# Patient Record
Sex: FEMALE | Race: BLACK OR AFRICAN AMERICAN | NOT HISPANIC OR LATINO | Employment: FULL TIME | URBAN - METROPOLITAN AREA
[De-identification: names, ages, dates, MRNs, and addresses within clinical notes are randomized per-mention and may not be internally consistent; named-entity substitution may affect disease eponyms.]

---

## 2023-03-20 LAB
ABO GROUP (TYPE) IN BLOOD: NORMAL
ANTIBODY SCREEN: NORMAL
ERYTHROCYTE DISTRIBUTION WIDTH (RATIO) BY AUTOMATED COUNT: 12.7 % (ref 11.5–14.5)
ERYTHROCYTE MEAN CORPUSCULAR HEMOGLOBIN CONCENTRATION (G/DL) BY AUTOMATED: 32.9 G/DL (ref 32–36)
ERYTHROCYTE MEAN CORPUSCULAR VOLUME (FL) BY AUTOMATED COUNT: 92 FL (ref 80–100)
ERYTHROCYTES (10*6/UL) IN BLOOD BY AUTOMATED COUNT: 3.82 X10E12/L (ref 4–5.2)
HEMATOCRIT (%) IN BLOOD BY AUTOMATED COUNT: 35.3 % (ref 36–46)
HEMOGLOBIN (G/DL) IN BLOOD: 11.6 G/DL (ref 12–16)
HEPATITIS B VIRUS SURFACE AG PRESENCE IN SERUM: NONREACTIVE
HEPATITIS C VIRUS AB PRESENCE IN SERUM: NONREACTIVE
HIV 1/ 2 AG/AB SCREEN: NONREACTIVE
LEUKOCYTES (10*3/UL) IN BLOOD BY AUTOMATED COUNT: 12.4 X10E9/L (ref 4.4–11.3)
NRBC (PER 100 WBCS) BY AUTOMATED COUNT: 0 /100 WBC (ref 0–0)
PLATELETS (10*3/UL) IN BLOOD AUTOMATED COUNT: 339 X10E9/L (ref 150–450)
REFLEX ADDED, ANEMIA PANEL: ABNORMAL
RH FACTOR: NORMAL
RUBELLA VIRUS IGG AB: POSITIVE
SYPHILIS TOTAL AB: NONREACTIVE

## 2023-03-21 LAB
CHLAMYDIA TRACH., AMPLIFIED: NEGATIVE
CLUE CELLS: ABNORMAL
N. GONORRHEA, AMPLIFIED: NEGATIVE
NUGENT SCORE: 1
TRICHOMONAS VAGINALIS: NEGATIVE
URINE CULTURE: NORMAL
YEAST: PRESENT

## 2023-03-23 LAB
HEMOGLOBIN A2: 2.7 %
HEMOGLOBIN A: 96.4 %
HEMOGLOBIN F: 0.9 %
HEMOGLOBIN IDENTIFICATION INTERPRETATION: NORMAL
PATH REVIEW-HGB IDENTIFICATION: NORMAL

## 2023-06-08 LAB
CLUE CELLS: ABNORMAL
NUGENT SCORE: 2
YEAST: PRESENT

## 2023-06-09 LAB
CHLAMYDIA TRACH., AMPLIFIED: NEGATIVE
N. GONORRHEA, AMPLIFIED: NEGATIVE
TRICHOMONAS VAGINALIS: NEGATIVE

## 2023-07-15 LAB — FUNGAL SCREEN, YEAST: NORMAL

## 2023-07-16 ENCOUNTER — HOSPITAL ENCOUNTER (OUTPATIENT)
Dept: DATA CONVERSION | Facility: HOSPITAL | Age: 23
End: 2023-07-16
Attending: STUDENT IN AN ORGANIZED HEALTH CARE EDUCATION/TRAINING PROGRAM
Payer: COMMERCIAL

## 2023-07-16 DIAGNOSIS — Z34.80 ENCOUNTER FOR SUPERVISION OF OTHER NORMAL PREGNANCY, UNSPECIFIED TRIMESTER (HHS-HCC): ICD-10-CM

## 2023-07-16 DIAGNOSIS — O09.33 SUPERVISION OF PREGNANCY WITH INSUFFICIENT ANTENATAL CARE, THIRD TRIMESTER (HHS-HCC): ICD-10-CM

## 2023-07-16 DIAGNOSIS — N89.8 OTHER SPECIFIED NONINFLAMMATORY DISORDERS OF VAGINA: ICD-10-CM

## 2023-07-16 DIAGNOSIS — O26.893 OTHER SPECIFIED PREGNANCY RELATED CONDITIONS, THIRD TRIMESTER (HHS-HCC): ICD-10-CM

## 2023-07-16 DIAGNOSIS — Z3A.28 28 WEEKS GESTATION OF PREGNANCY (HHS-HCC): ICD-10-CM

## 2023-07-16 LAB
CLUE CELLS WET PREP: NORMAL
ERYTHROCYTE DISTRIBUTION WIDTH (RATIO) BY AUTOMATED COUNT: 12.3 % (ref 11.5–14.5)
ERYTHROCYTE MEAN CORPUSCULAR HEMOGLOBIN CONCENTRATION (G/DL) BY AUTOMATED: 32.5 G/DL (ref 32–36)
ERYTHROCYTE MEAN CORPUSCULAR VOLUME (FL) BY AUTOMATED COUNT: 94 FL (ref 80–100)
ERYTHROCYTES (10*6/UL) IN BLOOD BY AUTOMATED COUNT: 3.4 X10E12/L (ref 4–5.2)
FERRITIN, PREGNANCY: 20 UG/L
FOLATE, SERUM, PREGNANCY: >24 NG/ML
GLUCOSE, 1 HR SCREEN, PREG: 80 MG/DL
HEMATOCRIT (%) IN BLOOD BY AUTOMATED COUNT: 32 % (ref 36–46)
HEMOGLOBIN (G/DL) IN BLOOD: 10.4 G/DL (ref 12–16)
IRON (UG/DL) IN SER/PLAS IN PREGNANCY: 61 UG/DL
IRON BINDING CAPACITY (UG/DL) IN PREGNANCY: 503 UG/DL
IRON SATURATION (%) IN PREGNANCY: 12 %
LEUKOCYTES (10*3/UL) IN BLOOD BY AUTOMATED COUNT: 10.4 X10E9/L (ref 4.4–11.3)
NRBC (PER 100 WBCS) BY AUTOMATED COUNT: 0 /100 WBC (ref 0–0)
PLATELETS (10*3/UL) IN BLOOD AUTOMATED COUNT: 278 X10E9/L (ref 150–450)
REFLEX ADDED, ANEMIA PANEL: ABNORMAL
SYPHILIS TOTAL AB: NONREACTIVE
TRICH WET PREP: NORMAL
TRICHOMONAS REFLEX COMMENT: NORMAL
VITAMIN B12, PREGNANCY: 294 PG/ML
WBC WET PREP: NORMAL /HPF
YEAST PRESENCE WET PREP: NORMAL

## 2023-08-12 LAB
CLUE CELLS WET PREP: NORMAL
TRICH WET PREP: NORMAL
TRICHOMONAS REFLEX COMMENT: NORMAL
WBC WET PREP: NORMAL /HPF
YEAST PRESENCE WET PREP: NORMAL

## 2023-08-13 LAB — TRICHOMONAS VAGINALIS: NORMAL

## 2023-08-14 LAB
URINE CULTURE: ABNORMAL
URINE CULTURE: ABNORMAL

## 2023-08-21 LAB — UREAPLASMA/MYCOPLASMA CULTURE: NORMAL

## 2023-09-16 LAB
GROUP B STREP SCREEN: NORMAL
URINE CULTURE: ABNORMAL
URINE CULTURE: ABNORMAL

## 2023-09-29 ENCOUNTER — HOSPITAL ENCOUNTER (INPATIENT)
Dept: DATA CONVERSION | Facility: HOSPITAL | Age: 23
LOS: 2 days | Discharge: HOME | End: 2023-10-01
Attending: OBSTETRICS & GYNECOLOGY | Admitting: OBSTETRICS & GYNECOLOGY
Payer: COMMERCIAL

## 2023-09-29 ENCOUNTER — PATIENT OUTREACH (OUTPATIENT)
Dept: CARE COORDINATION | Facility: CLINIC | Age: 23
End: 2023-09-29
Payer: COMMERCIAL

## 2023-09-29 ENCOUNTER — MOTHER BABY LINK (OUTPATIENT)
Age: 23
End: 2023-09-29

## 2023-09-29 VITALS — HEIGHT: 65 IN | BODY MASS INDEX: 27.73 KG/M2 | WEIGHT: 166.45 LBS

## 2023-09-29 DIAGNOSIS — R87.610 ATYPICAL SQUAMOUS CELLS OF UNDETERMINED SIGNIFICANCE ON CYTOLOGIC SMEAR OF CERVIX (ASC-US): ICD-10-CM

## 2023-09-29 LAB
ABO GROUP (TYPE) IN BLOOD: NORMAL
ABO GROUP (TYPE) IN BLOOD: NORMAL
ANTIBODY SCREEN: NORMAL
ANTIBODY SCREEN: NORMAL
ERYTHROCYTE DISTRIBUTION WIDTH (RATIO) BY AUTOMATED COUNT: 13.1 % (ref 11.5–14.5)
ERYTHROCYTE DISTRIBUTION WIDTH (RATIO) BY AUTOMATED COUNT: NORMAL
ERYTHROCYTE MEAN CORPUSCULAR HEMOGLOBIN CONCENTRATION (G/DL) BY AUTOMATED: 32.4 G/DL (ref 32–36)
ERYTHROCYTE MEAN CORPUSCULAR HEMOGLOBIN CONCENTRATION (G/DL) BY AUTOMATED: NORMAL
ERYTHROCYTE MEAN CORPUSCULAR VOLUME (FL) BY AUTOMATED COUNT: 96 FL (ref 80–100)
ERYTHROCYTE MEAN CORPUSCULAR VOLUME (FL) BY AUTOMATED COUNT: NORMAL
ERYTHROCYTES (10*6/UL) IN BLOOD BY AUTOMATED COUNT: 3.7 X10E12/L (ref 4–5.2)
ERYTHROCYTES (10*6/UL) IN BLOOD BY AUTOMATED COUNT: NORMAL
HEMATOCRIT (%) IN BLOOD BY AUTOMATED COUNT: 35.5 % (ref 36–46)
HEMATOCRIT (%) IN BLOOD BY AUTOMATED COUNT: NORMAL
HEMOGLOBIN (G/DL) IN BLOOD: 11.5 G/DL (ref 12–16)
HEMOGLOBIN (G/DL) IN BLOOD: NORMAL
LEUKOCYTES (10*3/UL) IN BLOOD BY AUTOMATED COUNT: 11.2 X10E9/L (ref 4.4–11.3)
LEUKOCYTES (10*3/UL) IN BLOOD BY AUTOMATED COUNT: NORMAL
NRBC (PER 100 WBCS) BY AUTOMATED COUNT: 0 /100 WBC (ref 0–0)
NRBC (PER 100 WBCS) BY AUTOMATED COUNT: NORMAL
PLATELETS (10*3/UL) IN BLOOD AUTOMATED COUNT: 338 X10E9/L (ref 150–450)
PLATELETS (10*3/UL) IN BLOOD AUTOMATED COUNT: NORMAL
RH FACTOR: NORMAL
RH FACTOR: NORMAL
SYPHILIS TOTAL AB: NONREACTIVE

## 2023-09-29 PROCEDURE — 86900 BLOOD TYPING SEROLOGIC ABO: CPT

## 2023-09-29 PROCEDURE — 59409 OBSTETRICAL CARE: CPT

## 2023-09-29 PROCEDURE — 9990 CHARGE CONVERSION

## 2023-09-29 PROCEDURE — 86901 BLOOD TYPING SEROLOGIC RH(D): CPT

## 2023-09-29 PROCEDURE — 85027 COMPLETE CBC AUTOMATED: CPT

## 2023-09-29 PROCEDURE — 99213 OFFICE O/P EST LOW 20 MIN: CPT

## 2023-09-29 PROCEDURE — 86780 TREPONEMA PALLIDUM: CPT

## 2023-09-29 PROCEDURE — 88307 TISSUE EXAM BY PATHOLOGIST: CPT

## 2023-09-29 PROCEDURE — 76815 OB US LIMITED FETUS(S): CPT

## 2023-09-29 PROCEDURE — 86850 RBC ANTIBODY SCREEN: CPT

## 2023-09-29 RX ORDER — NIFEDIPINE 10 MG/1
10 CAPSULE ORAL ONCE AS NEEDED
Status: DISCONTINUED | OUTPATIENT
Start: 2023-09-30 | End: 2023-09-29

## 2023-09-29 RX ORDER — ACETAMINOPHEN 325 MG/1
650 TABLET ORAL EVERY 6 HOURS
Status: DISCONTINUED | OUTPATIENT
Start: 2023-09-30 | End: 2023-09-30

## 2023-09-29 RX ORDER — MEDROXYPROGESTERONE ACETATE 150 MG/ML
150 INJECTION, SUSPENSION INTRAMUSCULAR ONCE AS NEEDED
Status: DISCONTINUED | OUTPATIENT
Start: 2023-09-30 | End: 2023-09-29

## 2023-09-29 RX ORDER — ALUMINUM HYDROXIDE, MAGNESIUM HYDROXIDE, AND SIMETHICONE 1200; 120; 1200 MG/30ML; MG/30ML; MG/30ML
30 SUSPENSION ORAL EVERY 4 HOURS PRN
Status: DISCONTINUED | OUTPATIENT
Start: 2023-09-30 | End: 2023-09-29

## 2023-09-29 RX ORDER — ACETAMINOPHEN 325 MG/1
975 TABLET ORAL EVERY 6 HOURS
Status: DISCONTINUED | OUTPATIENT
Start: 2023-09-30 | End: 2023-10-01 | Stop reason: HOSPADM

## 2023-09-29 RX ORDER — ONDANSETRON HYDROCHLORIDE 2 MG/ML
4 INJECTION, SOLUTION INTRAVENOUS EVERY 6 HOURS PRN
Status: DISCONTINUED | OUTPATIENT
Start: 2023-09-30 | End: 2023-09-29

## 2023-09-29 RX ORDER — MISOPROSTOL 200 UG/1
800 TABLET ORAL ONCE AS NEEDED
Status: DISCONTINUED | OUTPATIENT
Start: 2023-09-30 | End: 2023-09-29

## 2023-09-29 RX ORDER — ADHESIVE BANDAGE
10 BANDAGE TOPICAL DAILY PRN
Status: DISCONTINUED | OUTPATIENT
Start: 2023-09-30 | End: 2023-09-29

## 2023-09-29 RX ORDER — IBUPROFEN 600 MG/1
600 TABLET ORAL EVERY 6 HOURS
Status: DISCONTINUED | OUTPATIENT
Start: 2023-09-30 | End: 2023-10-01 | Stop reason: HOSPADM

## 2023-09-29 RX ORDER — METOCLOPRAMIDE HYDROCHLORIDE 5 MG/ML
10 INJECTION INTRAMUSCULAR; INTRAVENOUS EVERY 6 HOURS PRN
Status: DISCONTINUED | OUTPATIENT
Start: 2023-09-30 | End: 2023-09-29

## 2023-09-29 RX ORDER — OXYTOCIN 10 [USP'U]/ML
10 INJECTION, SOLUTION INTRAMUSCULAR; INTRAVENOUS ONCE AS NEEDED
Status: DISCONTINUED | OUTPATIENT
Start: 2023-09-30 | End: 2023-09-29

## 2023-09-29 RX ORDER — METHYLERGONOVINE MALEATE 0.2 MG/ML
0.2 INJECTION INTRAVENOUS ONCE AS NEEDED
Status: DISCONTINUED | OUTPATIENT
Start: 2023-09-30 | End: 2023-09-29

## 2023-09-29 RX ORDER — CARBOPROST TROMETHAMINE 250 UG/ML
250 INJECTION, SOLUTION INTRAMUSCULAR ONCE AS NEEDED
Status: DISCONTINUED | OUTPATIENT
Start: 2023-09-30 | End: 2023-09-29

## 2023-09-29 RX ORDER — DIPHENHYDRAMINE HCL 25 MG
25 CAPSULE ORAL EVERY 6 HOURS PRN
Status: DISCONTINUED | OUTPATIENT
Start: 2023-09-30 | End: 2023-09-29

## 2023-09-29 RX ORDER — HYDRALAZINE HYDROCHLORIDE 20 MG/ML
5 INJECTION INTRAMUSCULAR; INTRAVENOUS ONCE AS NEEDED
Status: DISCONTINUED | OUTPATIENT
Start: 2023-09-30 | End: 2023-09-29

## 2023-09-29 RX ORDER — POLYETHYLENE GLYCOL 3350 17 G/17G
17 POWDER, FOR SOLUTION ORAL 2 TIMES DAILY PRN
Status: DISCONTINUED | OUTPATIENT
Start: 2023-09-30 | End: 2023-09-29

## 2023-09-29 RX ORDER — BISACODYL 10 MG/1
10 SUPPOSITORY RECTAL DAILY PRN
Status: DISCONTINUED | OUTPATIENT
Start: 2023-09-30 | End: 2023-09-29

## 2023-09-29 RX ORDER — TRANEXAMIC ACID 100 MG/ML
1000 INJECTION, SOLUTION INTRAVENOUS ONCE AS NEEDED
Status: DISCONTINUED | OUTPATIENT
Start: 2023-09-30 | End: 2023-09-29

## 2023-09-29 RX ORDER — LOPERAMIDE HYDROCHLORIDE 2 MG/1
4 CAPSULE ORAL EVERY 2 HOUR PRN
Status: DISCONTINUED | OUTPATIENT
Start: 2023-09-30 | End: 2023-09-29

## 2023-09-29 RX ORDER — IBUPROFEN 600 MG/1
600 TABLET ORAL EVERY 6 HOURS SCHEDULED
Status: DISCONTINUED | OUTPATIENT
Start: 2023-09-30 | End: 2023-09-30

## 2023-09-29 RX ORDER — LABETALOL HYDROCHLORIDE 5 MG/ML
20 INJECTION, SOLUTION INTRAVENOUS ONCE AS NEEDED
Status: DISCONTINUED | OUTPATIENT
Start: 2023-09-30 | End: 2023-09-29

## 2023-09-29 ASSESSMENT — EDINBURGH POSTNATAL DEPRESSION SCALE (EPDS)
I HAVE BEEN ABLE TO LAUGH AND SEE THE FUNNY SIDE OF THINGS: NOT QUITE SO MUCH NOW
I HAVE FELT SAD OR MISERABLE: YES, MOST OF THE TIME
I HAVE FELT SCARED OR PANICKY FOR NO GOOD REASON: YES, SOMETIMES
I HAVE BEEN ANXIOUS OR WORRIED FOR NO GOOD REASON: YES, VERY OFTEN
TOTAL SCORE: 20
I HAVE LOOKED FORWARD WITH ENJOYMENT TO THINGS: RATHER LESS THAN I USED TO
I HAVE BLAMED MYSELF UNNECESSARILY WHEN THINGS WENT WRONG: YES, SOME OF THE TIME
I HAVE BEEN SO UNHAPPY THAT I HAVE HAD DIFFICULTY SLEEPING: YES, SOMETIMES
THINGS HAVE BEEN GETTING ON TOP OF ME: YES, SOMETIMES I HAVEN'T BEEN COPING AS WELL AS USUAL
THE THOUGHT OF HARMING MYSELF HAS OCCURRED TO ME: SOMETIMES
I HAVE BEEN SO UNHAPPY THAT I HAVE BEEN CRYING: YES, QUITE OFTEN

## 2023-09-30 PROCEDURE — 86901 BLOOD TYPING SEROLOGIC RH(D): CPT

## 2023-09-30 PROCEDURE — 86900 BLOOD TYPING SEROLOGIC ABO: CPT

## 2023-09-30 PROCEDURE — 86780 TREPONEMA PALLIDUM: CPT

## 2023-09-30 PROCEDURE — 9990 CHARGE CONVERSION

## 2023-09-30 PROCEDURE — 85027 COMPLETE CBC AUTOMATED: CPT

## 2023-09-30 PROCEDURE — 86850 RBC ANTIBODY SCREEN: CPT

## 2023-09-30 PROCEDURE — 1120000001 HC OB PRIVATE ROOM DAILY

## 2023-09-30 ASSESSMENT — PAIN SCALES - GENERAL
PAINLEVEL_OUTOF10: 7
PAINLEVEL_OUTOF10: 0 - NO PAIN
PAINLEVEL_OUTOF10: 6
PAINLEVEL_OUTOF10: 0 - NO PAIN

## 2023-09-30 ASSESSMENT — PAIN DESCRIPTION - DESCRIPTORS: DESCRIPTORS: SORE

## 2023-09-30 NOTE — H&P
HPI/OB History:   Prenatal Care Provider: [place]     HPI Descriptive Info:  ·  HPI    23y  at 39.1wga by 16w US presented in active labor, then delivered shortly after arrival.    Pregnancy c/b:  - Anemia  - Short interval pregnancy    Ob:  -  () @ 39.4wga   Gyn: denies hx of STIs or abnormal Paps   PMH: denies  PSH: denies  Meds: PNV  Allergies: Latex  Family Hx: reviewed-non contributory   Social: denies t/e/d        Prenatal Labs:   Prenatal Labs:    Prenatal Labs:   Blood Typed Date: 20-Mar-2023   Blood Type: O positive   Antibody Screen Results: negative   Chlamydia Date: 2023   Chlamydia Results: negative   Gonorrhea Date: 2023   Gonorrhea Results: negative   Group B Strep Date: 12-Sep-2023   Strep Results: negative   GCT (dd-mmm-yy): 2023   GCT result: 80   HBsAG Date: 20-Mar-2023   HBsAG Results: negative   HIV Date: 20-Mar-2023   HIV Results: negative   Rubella Date: 20-Mar-2023   Rubella Results: immune   Rubella Comments: Result Value POSITIVE   Syphilis (mmm-dd-yyyy): 2023   Syphilis Results: negative     Antepartum/Postpartum:   Antepartum/PP:  Delivery Date 29-Sep-2023   Days since delivery 0              Allergies:  ·  NKDA :   ·  Latex : Anaphylaxis (Mild)    Medications Prior to Admission:   Home meds have been reviewed, but review is not yet complete  Prenatal 1 Plus 1 oral tablet: 1 tab(s) orally once a day.    Objective:     Objective Information:      T   P  R  BP   MAP  SpO2   Value     58     118/77   94  100%  Date/Time    9:32    9:29   9:29  9:32  Range     (57 - 60 )    (118 - 118 )/ (77 - 77 )  (94 - 94 )  (99% - 100% )    Physical Exam by System:    Constitutional: alert, oriented   Obstetric: fundus firm <u   Eyes: EOMI   ENMT: MMM   Head/Neck: NC/AT   Respiratory/Thorax: normal respiratory effort   Gastrointestinal: gravid, non-tender   Musculoskeletal: normal ROM   Extremities: no LE redness or swelling   Psychological:  normal mood and affect   Skin: warm and dry     Recent Lab Results:    Results:    CBC: 2023 09:24              \     Hgb     /                              \     Canceled       /  WBC  ----------------  Plt               Canceled       ----------------    Canceled              /     Hct     \                              /     Canceled       \            RBC: Canceled     MCV: Canceled         Assessment and Plan:   Assessment:    23y  at 39.1wga by 16w US presented in active labor, then delivered shortly after arrival.    PPD#0  - pain control with toradol, tylenol   - fundus firm and nontender  - vitals wnl, ambulating, tolerating regular diet, voiding spontaneously  - minimal lochia, continue to monitor for si/sy of anemia   - continue routine postpartum care     Feeding - breast/bottle  - Continue to encourage breast feeding  - Lactation consult as needed     Postpartum Birth control  - Plans for interval IUD      DVT prophylaxis  - SCDs  - Ambulation  - Low risk for VTE, no indication for pharmacologic PPx     Dispo: anticipate discharge on PPD#2 if continues to meet milestones. Plan for follow up in 4-6 weeks for PP visit.    d/w Dr. Gt Cristina, PGY-4     Attestation:   Note Completion:  I am a:  Resident/Fellow   Attending Attestation I saw and evaluated the patient.  I personally obtained the key and critical portions of the history and physical exam or was physically present for key and  critical portions performed by the resident/fellow. I reviewed the resident/fellow?s documentation and discussed the patient with the resident/fellow.  I agree with the resident/fellow?s medical decision making as documented in the note.     I personally evaluated the patient on 29-Sep-2023   Comments/ Additional Findings    Patient presented with subsequent precipitous vaginal delivery. Uncomplicated pregnancy and delivery.     Sylvia Del Real MD           Electronic  Signatures:  Sylvia Del Real)  (Signed 29-Sep-2023 09:58)   Authored: Note Completion   Co-Signer: HPI/OB History, Prenatal Labs, Antepartum/PP, Allergies, Medications Prior to Admission, Objective, Assessment and Plan, Note  Completion  Didi Cristina (Resident))  (Signed 29-Sep-2023 09:57)   Authored: HPI/OB History, Prenatal Labs, Antepartum/PP,  Allergies, Medications Prior to Admission, Objective, Assessment and Plan, Note Completion      Last Updated: 29-Sep-2023 09:58 by Sylvia Del Real)

## 2023-09-30 NOTE — PROGRESS NOTES
Postpartum Progress Note    Assessment/Plan   Eloise Garcia is a 23 y.o., , who delivered at 39w2d gestation and is now postpartum day 1.    Postpartum  PP course uneventful.  Meeting all postpartum milestones- ambulating independently, passing flatus, tolerating PO intake, lochia light, voiding spontaneously, and pain well controlled with PO meds.   Rh: pos  DVT low risk for SCDs    Anemia- Hg 11.5 on admit    Dispo  Anticipate DC PPD2 if continues to meet milestones  - 4-6wk f/u    Active Problems:  There are no active Hospital Problems.    Pregnancy Problems (from 24 to present)       No problems associated with this episode.          Hospital course: no complications       Subjective   Her pain is well controlled with current medications  She is passing flatus  She is ambulating well  She is tolerating a Adult diet Regular  She reports no breast or nursing problems  She denies emotional concerns today   Her plan for contraception is IUD w/progesterone, interval         Objective   Allergies:   Latex         Last Vitals:  Temp Pulse Resp BP MAP Pulse Ox   37.1 °C (98.8 °F) 90 16 123/70 87 mmHg 94 %     Vitals Min/Max Last 24 Hours:  Temp  Min: 36.2 °C (97.2 °F)  Max: 37.1 °C (98.8 °F)  Pulse  Min: 76  Max: 90  Resp  Min: 16  Max: 18  BP  Min: 108/69  Max: 123/70  MAP  Min: 80 mmHg  Max: 87 mmHg    Intake/Output:     Intake/Output Summary (Last 24 hours) at 2023 1639  Last data filed at 2023 0817  Gross per 24 hour   Intake --   Output 850 ml   Net -850 ml       Physical Exam:  General: Examination reveals a well developed, well nourished, female, in no acute distress. She is alert and cooperative.  Fundus: firm and below umbilicus.    Lab Data:  Lab Results   Component Value Date    WBC 11.2 2023    HGB 11.5 (L) 2023    HCT 35.5 (L) 2023     2023

## 2023-10-01 VITALS
WEIGHT: 167.99 LBS | SYSTOLIC BLOOD PRESSURE: 105 MMHG | OXYGEN SATURATION: 96 % | TEMPERATURE: 98.4 F | DIASTOLIC BLOOD PRESSURE: 67 MMHG | HEART RATE: 80 BPM | RESPIRATION RATE: 18 BRPM | HEIGHT: 66 IN | BODY MASS INDEX: 27 KG/M2

## 2023-10-01 PROCEDURE — 2500000001 HC RX 250 WO HCPCS SELF ADMINISTERED DRUGS (ALT 637 FOR MEDICARE OP): Performed by: OBSTETRICS & GYNECOLOGY

## 2023-10-01 RX ORDER — IBUPROFEN 600 MG/1
600 TABLET ORAL EVERY 6 HOURS
Qty: 60 TABLET | Refills: 0 | Status: SHIPPED | OUTPATIENT
Start: 2023-10-01 | End: 2023-11-09 | Stop reason: ALTCHOICE

## 2023-10-01 RX ORDER — POLYETHYLENE GLYCOL 3350 17 G/17G
17 POWDER, FOR SOLUTION ORAL DAILY
Qty: 14 PACKET | Refills: 0 | Status: SHIPPED | OUTPATIENT
Start: 2023-10-01 | End: 2023-11-09 | Stop reason: ALTCHOICE

## 2023-10-01 RX ORDER — ACETAMINOPHEN 325 MG/1
975 TABLET ORAL EVERY 6 HOURS
Qty: 120 TABLET | Refills: 0 | Status: SHIPPED | OUTPATIENT
Start: 2023-10-01 | End: 2023-11-09 | Stop reason: ALTCHOICE

## 2023-10-01 RX ADMIN — ACETAMINOPHEN 975 MG: 325 TABLET, FILM COATED ORAL at 11:43

## 2023-10-01 RX ADMIN — IBUPROFEN 600 MG: 600 TABLET, FILM COATED ORAL at 11:43

## 2023-10-01 ASSESSMENT — PAIN DESCRIPTION - DESCRIPTORS: DESCRIPTORS: SORE

## 2023-10-01 ASSESSMENT — PAIN SCALES - GENERAL
PAINLEVEL_OUTOF10: 7
PAINLEVEL_OUTOF10: 7

## 2023-10-01 NOTE — CARE PLAN
The patient's goals for the shift include      The clinical goals for the shift include      Over the shift, the patient did not make progress toward the following goals. Barriers to progression include none. Recommendations to address these barriers include none.

## 2023-10-01 NOTE — DISCHARGE SUMMARY
Discharge Summary    Eloise Garcia is a 23 y.o. year old female   Admission Date: 9/29/2023  Discharge Date: 10/01/23       Discharge Diagnosis  Spontaneous vaginal delivery    Hospital Course  Delivery Date: 9/29/2023  9:05 AM   Delivery type:   Spontaneous vaginal delivery  GA at delivery: Unknown  Outcome:  liveborn, uncomplicated  Intrapartum complications:  none  Feeding method: Breastfeeding Status: No     Procedures: none  Contraception at discharge: Interested in IUD. Defers contraception to primary OB/PP visit. We discussed pregnancy spacing of at least one year, abstaining from intercourse for 6wks, and the ability to become pregnant in the absence of regular menses. Pt verbalized understanding.    Pertinent Physical Exam At Time of Discharge    General: Examination reveals a well developed, well nourished, female, in no acute distress. She is alert and cooperative.  Lungs: non-labored, symmetrical breathing.  Cardiac: warm, well-perfused.  Abdomen: soft, non-tender.  Fundus: firm, below umbilicus, and nontender.  Extremities: no redness or tenderness in the calves or thighs.  Neurological: alert, oriented, normal speech, no focal findings or movement disorder noted.    Discharge Meds     Your medication list        START taking these medications        Instructions Last Dose Given Next Dose Due   ibuprofen 600 mg tablet      Take 1 tablet (600 mg) by mouth every 6 hours.       polyethylene glycol 17 gram packet  Commonly known as: Glycolax, Miralax      Take 17 g by mouth once daily.              CHANGE how you take these medications        Instructions Last Dose Given Next Dose Due   acetaminophen 325 mg tablet  Commonly known as: Tylenol  What changed:   when to take this  reasons to take this      Take 3 tablets (975 mg) by mouth every 6 hours.              CONTINUE taking these medications        Instructions Last Dose Given Next Dose Due   Prenatal Vitamin 27 mg iron-800 mcg folic acid  tablet  Generic drug: prenatal vitamin (iron-folic)      TAKE 1 TABLET BY MOUTH DAILY AS DIRECTED              STOP taking these medications      azithromycin 250 mg tablet  Commonly known as: Zithromax        FeroSuL 325 (65 Fe) MG tablet  Generic drug: ferrous sulfate        Hydrocortisone Plus 1 % cream  Generic drug: hydrocortisone        magnesium oxide 400 mg (241.3 mg magnesium) tablet  Commonly known as: Mag-Ox        terconazole 0.8 % vaginal cream  Commonly known as: Terazol 3                  Where to Get Your Medications        These medications were sent to [facility]  [address] [place] [address]      Hours: 8:30 AM to 5 PM Mon-Fri Phone: [phone]   acetaminophen 325 mg tablet  ibuprofen 600 mg tablet  polyethylene glycol 17 gram packet          Complications Requiring Follow-Up  None    Test Results Pending At Discharge  Pending Labs       Order Current Status    SURGICAL PATHOLOGY RESULTS In process            Outpatient Follow-Up  Follow up with your primary OB in 4-6 weeks for postpartum visit    I spent 20 minutes in the professional and overall care of this patient.      Kellen Branham, APRN-CNP

## 2023-10-02 NOTE — SIGNIFICANT EVENT
Follow-up Phone Call Note:   Interview:  Care Type: Women's Health   Phone Number Call [phone]   Call Outcome:no answer   Patient Reports Feeling (symptoms) Are:    Which Meds Were New Meds   Which Meds to Continue:    Which Meds to Stop:   Who participated in medication reconciliation with the hospital staff?:    In your professional opinion do you think there was a medication discrepancy or potential for medication discrepancy in this situation?: no   Medication Issues:   Discharge Instructions Clear:    Patient Has a Primary Care Provider:    Post-hospitalization Follow-up Occurred According To Schedule:    Reason: a   Delivered Baby(ies):    Chest Pain:    Shortness of breath or difficulty breathing:    Seizures:    Any thoughts of hurting yourself or your baby:   Bleeding that is soaking through one pad/hour or blood clots the size of an egg or bigger:    Incision that is not healing:    Red or swollen leg that is painful or warm to the touch:    Temperature of 100.4F or higher:   Headache that does not get better, even after taking medicine, or a bad headache with vision changes:    Where or in what is your baby sleeping?:   ABC's of sleep covered:    How Are You Feeding Your Baby(ies):   Baby Getting Anything Other Than Breastmilk Or Formula For Food:   Patient Has Primary Care Provider For Baby(ies):   Baby Has Been Seen By a Health Care Provider Since Discharge:   Which Health Care Provider Saw the Baby:      Date the Baby Was Seen By a Health Care Provider   Patient Has Plan Specific Name And Number Of Who To Call For Concerns:    Stroke Patient:    Date/Time Of Call: 10/2/23 at 1741   Call Back Done By: care coordinator   Callback Complete: yes

## 2023-10-03 ENCOUNTER — PHARMACY VISIT (OUTPATIENT)
Dept: PHARMACY | Facility: CLINIC | Age: 23
End: 2023-10-03
Payer: MEDICAID

## 2023-10-03 PROCEDURE — RXMED WILLOW AMBULATORY MEDICATION CHARGE

## 2023-10-05 LAB
COMPLETE PATHOLOGY REPORT: NORMAL
CONVERTED CLINICAL DIAGNOSIS-HISTORY: NORMAL
CONVERTED FINAL DIAGNOSIS: NORMAL
CONVERTED FINAL REPORT PDF LINK TO COPY AND PASTE: NORMAL
CONVERTED GROSS DESCRIPTION: NORMAL

## 2023-10-11 PROCEDURE — 9990 CHARGE CONVERSION

## 2023-10-11 PROCEDURE — 88307 TISSUE EXAM BY PATHOLOGIST: CPT

## 2023-11-09 ENCOUNTER — OFFICE VISIT (OUTPATIENT)
Dept: OBSTETRICS AND GYNECOLOGY | Facility: CLINIC | Age: 23
End: 2023-11-09
Payer: COMMERCIAL

## 2023-11-09 VITALS
DIASTOLIC BLOOD PRESSURE: 84 MMHG | WEIGHT: 156 LBS | BODY MASS INDEX: 25.25 KG/M2 | SYSTOLIC BLOOD PRESSURE: 123 MMHG | HEART RATE: 65 BPM

## 2023-11-09 DIAGNOSIS — Z11.3 ROUTINE SCREENING FOR STI (SEXUALLY TRANSMITTED INFECTION): Primary | ICD-10-CM

## 2023-11-09 DIAGNOSIS — Z30.430 ENCOUNTER FOR IUD INSERTION: ICD-10-CM

## 2023-11-09 PROCEDURE — 58300 INSERT INTRAUTERINE DEVICE: CPT | Performed by: ADVANCED PRACTICE MIDWIFE

## 2023-11-09 PROCEDURE — 99214 OFFICE O/P EST MOD 30 MIN: CPT | Performed by: ADVANCED PRACTICE MIDWIFE

## 2023-11-09 PROCEDURE — 87800 DETECT AGNT MULT DNA DIREC: CPT | Performed by: ADVANCED PRACTICE MIDWIFE

## 2023-11-09 PROCEDURE — 87661 TRICHOMONAS VAGINALIS AMPLIF: CPT | Mod: 59 | Performed by: ADVANCED PRACTICE MIDWIFE

## 2023-11-09 PROCEDURE — 2500000004 HC RX 250 GENERAL PHARMACY W/ HCPCS (ALT 636 FOR OP/ED): Mod: SE | Performed by: ADVANCED PRACTICE MIDWIFE

## 2023-11-09 RX ADMIN — LEVONORGESTREL 52 MG: 52 INTRAUTERINE DEVICE INTRAUTERINE at 13:36

## 2023-11-09 ASSESSMENT — EDINBURGH POSTNATAL DEPRESSION SCALE (EPDS)
THINGS HAVE BEEN GETTING ON TOP OF ME: NO, MOST OF THE TIME I HAVE COPED QUITE WELL
I HAVE BLAMED MYSELF UNNECESSARILY WHEN THINGS WENT WRONG: YES, SOME OF THE TIME
I HAVE FELT SCARED OR PANICKY FOR NO GOOD REASON: NO, NOT MUCH
TOTAL SCORE: 12
I HAVE BEEN ANXIOUS OR WORRIED FOR NO GOOD REASON: YES, SOMETIMES
THE THOUGHT OF HARMING MYSELF HAS OCCURRED TO ME: NEVER
I HAVE FELT SAD OR MISERABLE: YES, QUITE OFTEN
I HAVE BEEN SO UNHAPPY THAT I HAVE BEEN CRYING: ONLY OCCASIONALLY
I HAVE BEEN ABLE TO LAUGH AND SEE THE FUNNY SIDE OF THINGS: NOT QUITE SO MUCH NOW
I HAVE BEEN SO UNHAPPY THAT I HAVE HAD DIFFICULTY SLEEPING: NOT VERY OFTEN
I HAVE LOOKED FORWARD WITH ENJOYMENT TO THINGS: RATHER LESS THAN I USED TO

## 2023-11-09 NOTE — PROGRESS NOTES
Subjective   23 y.o.  presenting for postpartum follow-up.  Delivery Date: 2023  Gestational Age: 39.1  Type of Delivery: Vaginal, Spontaneous      Pregnancy Problems (from 23 to 23)    Short interval pregnancy   Anemia      Concerns: none  Lacerations: none  Lochia: stopped  Sexual Intimacy: Yes - reports pain after sex, burning and discomfort with urination. No lesions noted.   Has had unprotected sex multiple times in the last 3 weeks   Contraceptive Method: None  - desires IUD   Feeding Method: She is bottle feeding.  Lactation Consult Needed?: No  Birth Trauma: No  Bonding with Baby: well with baby girlValerie  Mood:   Postpartum Depression: High Risk (2023)    [place]  Depression Scale     Last EPDS Total Score: 12     Last EPDS Self Harm Result: Never     Last pap: 3/2023 NIL  Physical Exam  Constitutional:       Appearance: Normal appearance. She is normal weight.   Genitourinary:      Vulva normal.      Uterus is not enlarged or tender.      Uterus is anteverted.   HENT:      Head: Normocephalic.   Pulmonary:      Effort: Pulmonary effort is normal.   Abdominal:      General: Abdomen is flat.      Palpations: Abdomen is soft.   Musculoskeletal:      Cervical back: Normal range of motion.   Neurological:      General: No focal deficit present.      Mental Status: She is alert and oriented to person, place, and time.   Skin:     General: Skin is warm and dry.   Psychiatric:         Mood and Affect: Mood normal.         Behavior: Behavior normal.   Vitals reviewed.       IUD Insertion Procedure Note    Indications: contraception    Procedure Details   Urine pregnancy test was done today and result was negative .  The risks (including infection, bleeding, pain, and uterine perforation) and benefits of the procedure were explained to the patient and Verbal informed consent was obtained.      Procedure: Mirena (IUD) placement  Cervix cleansed with Betadine. Uterus sounded  to 9 cm.   Local anesthesia:   none  Tenaculum used:  Yes, single tooth, anterior  IUD inserted without difficulty.   String visible and trimmed to 3 cm.  Patient tolerated procedure well.    Condition:  Stable    Complications:  None    Plan:  The patient was advised to call for any fever or for prolonged or severe pain or bleeding. She was advised to use OTC ibuprofen as needed for mild to moderate pain.   IUD Insertion    Date/Time: 11/9/2023 2:26 PM    Performed by: JONAS Painting APRN-CNP  Authorized by: JONAS Painting APRN-CNP    Consent:     Consent obtained:  Verbal    Consent given by:  Patient    Procedure risks and benefits discussed: yes      Patient questions answered: yes      Patient agrees, verbalizes understanding, and wants to proceed: yes      Educational handouts given: yes      Instructions and paperwork completed: yes    Procedure:     Pelvic exam performed: yes      Negative urine pregnancy test: yes      Cervix cleaned and prepped: yes      Speculum placed in vagina: yes      Tenaculum applied to cervix: yes      Uterus sounded: yes      Uterus sound depth (cm):  9    IUD inserted with no complications: yes      IUD type:  Mirena  Post-procedure:     Patient tolerated procedure well: yes      Patient will follow up after next period: yes         OJNAS Painting, APRN-CNP    Plan    Advised to call office abnormal bleeding, mood changes, or other concerning symptoms.   Cleared to resume normal activity as desired  Continue counseling @ [facility]. Discussed PP depression/anxiety and warning signs. Patient declines additional mental health resources at this time.   Reviewed negative UPT today but I am unable to confirm she is not pregnant from recent unprotected intercourse iwht the last two weeks. Reviewed risks/benefits of IUD placement today. Discussed taking a pregnancy test in 2 weeks to confirm that she is not pregnant. Discussed removal with +UPT and chance of  SAB. Reviewed need for back up method x 7 days following insertion.     Diagnoses and all orders for this visit:  Routine screening for STI (sexually transmitted infection)  -     C. Trachomatis / N. Gonorrhoeae, Amplified Detection  -     TRICH VAGINALIS, AMPLIFIED  Encounter for postpartum visit  Encounter for IUD insertion  -     levonorgestrel (Mirena) 21 mcg/24 hours (8 yrs) 52 mg IUD    Carmel Horner, APRESPINOZA-DELICIAM, APRN-CNP

## 2023-11-10 LAB
C TRACH RRNA SPEC QL NAA+PROBE: NEGATIVE
N GONORRHOEA DNA SPEC QL PROBE+SIG AMP: NEGATIVE
T VAGINALIS RRNA SPEC QL NAA+PROBE: NEGATIVE

## 2023-11-21 ENCOUNTER — PHARMACY VISIT (OUTPATIENT)
Dept: PHARMACY | Facility: CLINIC | Age: 23
End: 2023-11-21
Payer: MEDICAID

## 2023-11-21 PROCEDURE — RXMED WILLOW AMBULATORY MEDICATION CHARGE

## 2023-11-21 RX ORDER — CHLORHEXIDINE GLUCONATE 40 MG/ML
SOLUTION TOPICAL
Qty: 236 ML | Refills: 3 | OUTPATIENT
Start: 2023-11-21 | End: 2023-12-29 | Stop reason: WASHOUT

## 2023-11-21 RX ORDER — ALUMINUM CHLORIDE 20 %
SOLUTION, NON-ORAL TOPICAL
Qty: 60 ML | Refills: 3 | OUTPATIENT
Start: 2023-11-21 | End: 2023-12-29 | Stop reason: WASHOUT

## 2023-12-11 ENCOUNTER — APPOINTMENT (OUTPATIENT)
Dept: OBSTETRICS AND GYNECOLOGY | Facility: HOSPITAL | Age: 23
End: 2023-12-11
Payer: COMMERCIAL

## 2023-12-29 ENCOUNTER — OFFICE VISIT (OUTPATIENT)
Dept: OBSTETRICS AND GYNECOLOGY | Facility: CLINIC | Age: 23
End: 2023-12-29
Payer: COMMERCIAL

## 2023-12-29 VITALS
WEIGHT: 153 LBS | HEART RATE: 85 BPM | SYSTOLIC BLOOD PRESSURE: 126 MMHG | DIASTOLIC BLOOD PRESSURE: 82 MMHG | BODY MASS INDEX: 24.77 KG/M2

## 2023-12-29 DIAGNOSIS — Z32.02 PREGNANCY TEST NEGATIVE: Primary | ICD-10-CM

## 2023-12-29 LAB — PREGNANCY TEST URINE, POC: NEGATIVE

## 2023-12-29 PROCEDURE — 1036F TOBACCO NON-USER: CPT

## 2023-12-29 PROCEDURE — 99213 OFFICE O/P EST LOW 20 MIN: CPT | Mod: GC

## 2023-12-29 PROCEDURE — 99213 OFFICE O/P EST LOW 20 MIN: CPT

## 2023-12-29 ASSESSMENT — PAIN SCALES - GENERAL: PAINLEVEL: 6

## 2023-12-29 NOTE — PROGRESS NOTES
I saw and evaluated the patient. I personally obtained the key and critical portions of the history and physical exam or was physically present for key and critical portions performed by the resident/fellow. I reviewed the resident/fellow's documentation and discussed the patient with the resident/fellow. I agree with the resident/fellow's medical decision making as documented in the note.    Karine Magana MD

## 2023-12-29 NOTE — PROGRESS NOTES
Subjective   Patient ID: Eloise Garcia is a 23 y.o. female who presents for IUD Check (Pt in office for IUD removal, new birth control/LMP; bleeding since IUD placement/Last pap 3/30/2023 wnl/Chaperone declined).    HPI  Patient reports spotting since the placement of her IUD on 11/9. Reports brown discharge on her underwear every day. Has had a light period since her delivery. Patient not interested in adding an OCP. Has been sexually active with no issues.    Review of Systems   All other systems reviewed and are negative.      Objective   Physical Exam  Vitals reviewed. Exam conducted with a chaperone present.   Constitutional:       General: She is not in acute distress.     Appearance: She is not toxic-appearing.   HENT:      Head: Normocephalic and atraumatic.      Nose: Nose normal.      Mouth/Throat:      Mouth: Mucous membranes are moist.   Eyes:      Extraocular Movements: Extraocular movements intact.      Conjunctiva/sclera: Conjunctivae normal.      Pupils: Pupils are equal, round, and reactive to light.   Cardiovascular:      Rate and Rhythm: Normal rate and regular rhythm.   Pulmonary:      Effort: Pulmonary effort is normal.      Breath sounds: Normal breath sounds.   Abdominal:      Palpations: Abdomen is soft.   Genitourinary:     General: Normal vulva.      Comments: Cervix visualized. Small amount of blood in vaginal vault. IUD strings visualized.  Skin:     General: Skin is warm and dry.   Neurological:      General: No focal deficit present.      Mental Status: She is alert.   Psychiatric:         Mood and Affect: Mood normal.         Assessment/Plan   22yo here for IUD check and vaginal spotting.    - counseled patient on bleeding expectations with hormonal IUD  - explained that spotting within the first 3 months is normal and will decrease with time  - patient wanting to keep IUD, not interested in additional contraception at this time    RTC as needed.         Seen and discussed w/   Izzy Marie MD PGY-1  Obstetrics & Gynecology

## 2024-09-07 ENCOUNTER — HOSPITAL ENCOUNTER (EMERGENCY)
Facility: HOSPITAL | Age: 24
Discharge: HOME | End: 2024-09-08
Attending: EMERGENCY MEDICINE
Payer: COMMERCIAL

## 2024-09-07 VITALS
TEMPERATURE: 97.3 F | WEIGHT: 145 LBS | RESPIRATION RATE: 18 BRPM | BODY MASS INDEX: 24.16 KG/M2 | HEIGHT: 65 IN | HEART RATE: 95 BPM | DIASTOLIC BLOOD PRESSURE: 59 MMHG | SYSTOLIC BLOOD PRESSURE: 114 MMHG | OXYGEN SATURATION: 95 %

## 2024-09-07 DIAGNOSIS — N76.0 BACTERIAL VAGINOSIS: Primary | ICD-10-CM

## 2024-09-07 DIAGNOSIS — B96.89 BACTERIAL VAGINOSIS: Primary | ICD-10-CM

## 2024-09-07 DIAGNOSIS — B37.31 VAGINAL YEAST INFECTION: ICD-10-CM

## 2024-09-07 PROCEDURE — 83690 ASSAY OF LIPASE: CPT | Performed by: EMERGENCY MEDICINE

## 2024-09-07 PROCEDURE — 86780 TREPONEMA PALLIDUM: CPT | Performed by: EMERGENCY MEDICINE

## 2024-09-07 PROCEDURE — 2500000001 HC RX 250 WO HCPCS SELF ADMINISTERED DRUGS (ALT 637 FOR MEDICARE OP): Mod: SE | Performed by: EMERGENCY MEDICINE

## 2024-09-07 PROCEDURE — 87661 TRICHOMONAS VAGINALIS AMPLIF: CPT | Performed by: EMERGENCY MEDICINE

## 2024-09-07 PROCEDURE — 99284 EMERGENCY DEPT VISIT MOD MDM: CPT | Performed by: EMERGENCY MEDICINE

## 2024-09-07 PROCEDURE — 99283 EMERGENCY DEPT VISIT LOW MDM: CPT

## 2024-09-07 PROCEDURE — 84702 CHORIONIC GONADOTROPIN TEST: CPT | Performed by: EMERGENCY MEDICINE

## 2024-09-07 PROCEDURE — 80053 COMPREHEN METABOLIC PANEL: CPT | Performed by: EMERGENCY MEDICINE

## 2024-09-07 PROCEDURE — 87210 SMEAR WET MOUNT SALINE/INK: CPT | Mod: 59 | Performed by: EMERGENCY MEDICINE

## 2024-09-07 PROCEDURE — 87491 CHLMYD TRACH DNA AMP PROBE: CPT | Performed by: EMERGENCY MEDICINE

## 2024-09-07 PROCEDURE — 85025 COMPLETE CBC W/AUTO DIFF WBC: CPT | Performed by: EMERGENCY MEDICINE

## 2024-09-07 PROCEDURE — 87389 HIV-1 AG W/HIV-1&-2 AB AG IA: CPT | Performed by: EMERGENCY MEDICINE

## 2024-09-07 PROCEDURE — 36415 COLL VENOUS BLD VENIPUNCTURE: CPT | Performed by: EMERGENCY MEDICINE

## 2024-09-07 RX ORDER — ACETAMINOPHEN 325 MG/1
650 TABLET ORAL ONCE
Status: COMPLETED | OUTPATIENT
Start: 2024-09-07 | End: 2024-09-07

## 2024-09-07 RX ORDER — ALUMINUM HYDROXIDE, MAGNESIUM HYDROXIDE, AND SIMETHICONE 1200; 120; 1200 MG/30ML; MG/30ML; MG/30ML
20 SUSPENSION ORAL ONCE
Status: COMPLETED | OUTPATIENT
Start: 2024-09-07 | End: 2024-09-07

## 2024-09-07 ASSESSMENT — PAIN - FUNCTIONAL ASSESSMENT: PAIN_FUNCTIONAL_ASSESSMENT: 0-10

## 2024-09-07 ASSESSMENT — COLUMBIA-SUICIDE SEVERITY RATING SCALE - C-SSRS
6. HAVE YOU EVER DONE ANYTHING, STARTED TO DO ANYTHING, OR PREPARED TO DO ANYTHING TO END YOUR LIFE?: NO
1. IN THE PAST MONTH, HAVE YOU WISHED YOU WERE DEAD OR WISHED YOU COULD GO TO SLEEP AND NOT WAKE UP?: NO
2. HAVE YOU ACTUALLY HAD ANY THOUGHTS OF KILLING YOURSELF?: NO

## 2024-09-07 ASSESSMENT — PAIN DESCRIPTION - LOCATION: LOCATION: ABDOMEN

## 2024-09-07 ASSESSMENT — PAIN SCALES - GENERAL: PAINLEVEL_OUTOF10: 8

## 2024-09-08 LAB
ALBUMIN SERPL BCP-MCNC: 4.7 G/DL (ref 3.4–5)
ALP SERPL-CCNC: 63 U/L (ref 33–110)
ALT SERPL W P-5'-P-CCNC: 9 U/L (ref 7–45)
ANION GAP SERPL CALC-SCNC: 15 MMOL/L (ref 10–20)
APPEARANCE UR: ABNORMAL
AST SERPL W P-5'-P-CCNC: 17 U/L (ref 9–39)
B-HCG SERPL-ACNC: <3 MIU/ML
BASOPHILS # BLD AUTO: 0.05 X10*3/UL (ref 0–0.1)
BASOPHILS NFR BLD AUTO: 0.6 %
BILIRUB SERPL-MCNC: 0.6 MG/DL (ref 0–1.2)
BILIRUB UR STRIP.AUTO-MCNC: NEGATIVE MG/DL
BUN SERPL-MCNC: 10 MG/DL (ref 6–23)
C TRACH RRNA SPEC QL NAA+PROBE: NEGATIVE
C TRACH RRNA SPEC QL NAA+PROBE: NEGATIVE
CALCIUM SERPL-MCNC: 9.3 MG/DL (ref 8.6–10.6)
CHLORIDE SERPL-SCNC: 105 MMOL/L (ref 98–107)
CLUE CELLS SPEC QL WET PREP: PRESENT
CO2 SERPL-SCNC: 23 MMOL/L (ref 21–32)
COLOR UR: YELLOW
CREAT SERPL-MCNC: 0.76 MG/DL (ref 0.5–1.05)
EGFRCR SERPLBLD CKD-EPI 2021: >90 ML/MIN/1.73M*2
EOSINOPHIL # BLD AUTO: 0.08 X10*3/UL (ref 0–0.7)
EOSINOPHIL NFR BLD AUTO: 0.9 %
ERYTHROCYTE [DISTWIDTH] IN BLOOD BY AUTOMATED COUNT: 13.2 % (ref 11.5–14.5)
GLUCOSE SERPL-MCNC: 84 MG/DL (ref 74–99)
GLUCOSE UR STRIP.AUTO-MCNC: NORMAL MG/DL
HCT VFR BLD AUTO: 37 % (ref 36–46)
HGB BLD-MCNC: 12.8 G/DL (ref 12–16)
HIV 1+2 AB+HIV1 P24 AG SERPL QL IA: NONREACTIVE
HOLD SPECIMEN: NORMAL
IMM GRANULOCYTES # BLD AUTO: 0.02 X10*3/UL (ref 0–0.7)
IMM GRANULOCYTES NFR BLD AUTO: 0.2 % (ref 0–0.9)
KETONES UR STRIP.AUTO-MCNC: ABNORMAL MG/DL
LEUKOCYTE ESTERASE UR QL STRIP.AUTO: ABNORMAL
LIPASE SERPL-CCNC: 8 U/L (ref 9–82)
LYMPHOCYTES # BLD AUTO: 2.74 X10*3/UL (ref 1.2–4.8)
LYMPHOCYTES NFR BLD AUTO: 32 %
MCH RBC QN AUTO: 30.6 PG (ref 26–34)
MCHC RBC AUTO-ENTMCNC: 34.6 G/DL (ref 32–36)
MCV RBC AUTO: 89 FL (ref 80–100)
MONOCYTES # BLD AUTO: 0.37 X10*3/UL (ref 0.1–1)
MONOCYTES NFR BLD AUTO: 4.3 %
MUCOUS THREADS #/AREA URNS AUTO: NORMAL /LPF
N GONORRHOEA DNA SPEC QL PROBE+SIG AMP: NEGATIVE
N GONORRHOEA DNA SPEC QL PROBE+SIG AMP: NEGATIVE
NEUTROPHILS # BLD AUTO: 5.3 X10*3/UL (ref 1.2–7.7)
NEUTROPHILS NFR BLD AUTO: 62 %
NITRITE UR QL STRIP.AUTO: NEGATIVE
NRBC BLD-RTO: 0 /100 WBCS (ref 0–0)
PH UR STRIP.AUTO: 5.5 [PH]
PLATELET # BLD AUTO: 420 X10*3/UL (ref 150–450)
POTASSIUM SERPL-SCNC: 3.6 MMOL/L (ref 3.5–5.3)
PROT SERPL-MCNC: 8.2 G/DL (ref 6.4–8.2)
PROT UR STRIP.AUTO-MCNC: NEGATIVE MG/DL
RBC # BLD AUTO: 4.18 X10*6/UL (ref 4–5.2)
RBC # UR STRIP.AUTO: NEGATIVE /UL
RBC #/AREA URNS AUTO: NORMAL /HPF
SODIUM SERPL-SCNC: 139 MMOL/L (ref 136–145)
SP GR UR STRIP.AUTO: 1.02
SQUAMOUS #/AREA URNS AUTO: NORMAL /HPF
T VAGINALIS SPEC QL WET PREP: ABNORMAL
TREPONEMA PALLIDUM IGG+IGM AB [PRESENCE] IN SERUM OR PLASMA BY IMMUNOASSAY: NONREACTIVE
TRICHOMONAS REFLEX COMMENT: ABNORMAL
UROBILINOGEN UR STRIP.AUTO-MCNC: NORMAL MG/DL
WBC # BLD AUTO: 8.6 X10*3/UL (ref 4.4–11.3)
WBC #/AREA URNS AUTO: NORMAL /HPF
WBC VAG QL WET PREP: ABNORMAL
YEAST VAG QL WET PREP: ABNORMAL

## 2024-09-08 PROCEDURE — 87086 URINE CULTURE/COLONY COUNT: CPT | Performed by: EMERGENCY MEDICINE

## 2024-09-08 PROCEDURE — 2500000001 HC RX 250 WO HCPCS SELF ADMINISTERED DRUGS (ALT 637 FOR MEDICARE OP): Mod: SE | Performed by: EMERGENCY MEDICINE

## 2024-09-08 PROCEDURE — 81001 URINALYSIS AUTO W/SCOPE: CPT | Performed by: EMERGENCY MEDICINE

## 2024-09-08 RX ORDER — METRONIDAZOLE 500 MG/1
500 TABLET ORAL 2 TIMES DAILY
Qty: 14 TABLET | Refills: 0 | Status: SHIPPED | OUTPATIENT
Start: 2024-09-08 | End: 2024-09-20

## 2024-09-08 RX ORDER — FLUCONAZOLE 150 MG/1
150 TABLET ORAL ONCE
Qty: 1 TABLET | Refills: 0 | Status: SHIPPED | OUTPATIENT
Start: 2024-09-08 | End: 2024-09-14

## 2024-09-08 RX ORDER — IBUPROFEN 600 MG/1
600 TABLET ORAL ONCE
Status: COMPLETED | OUTPATIENT
Start: 2024-09-08 | End: 2024-09-08

## 2024-09-08 ASSESSMENT — LIFESTYLE VARIABLES
HAVE PEOPLE ANNOYED YOU BY CRITICIZING YOUR DRINKING: NO
EVER FELT BAD OR GUILTY ABOUT YOUR DRINKING: NO
EVER HAD A DRINK FIRST THING IN THE MORNING TO STEADY YOUR NERVES TO GET RID OF A HANGOVER: NO
TOTAL SCORE: 0
HAVE YOU EVER FELT YOU SHOULD CUT DOWN ON YOUR DRINKING: NO

## 2024-09-08 ASSESSMENT — PAIN SCALES - GENERAL: PAINLEVEL_OUTOF10: 6

## 2024-09-08 NOTE — ED PROVIDER NOTES
HPI   Chief Complaint   Patient presents with    Abdominal Pain       24-year-old female history of unspecified psychiatric disorder but does see the mobile crisis unit, alcohol use disorder who is presenting with intermittent periumbilical pain for the last month.  Able to eat and drink without difficulty, normal bowel movements.  No nausea or vomiting.  Normal bowel movements.  No rashes, no chest pain, no fever.  No previous abdominal surgeries.  Denies vaginal bleeding, discharge, dysuria.  Has tested positive for chlamydia in the past.              Patient History   Past Medical History:   Diagnosis Date    Bromhidrosis 09/25/2020    Body odor    Central perforation of tympanic membrane, unspecified ear 12/17/2014    Central perforation of tympanic membrane    Chlamydial infection, unspecified 04/06/2018    Chlamydia infection    Developmental disorder of scholastic skills, unspecified     Problems with learning    Encounter for immunization 04/06/2018    Immunization due    Latex allergy     Other specified health status     Varicella vaccination status unknown    Personal history of other diseases of the nervous system and sense organs     History of hearing loss    Personal history of other mental and behavioral disorders     History of depression    Personal history of other mental and behavioral disorders 06/29/2018    History of anxiety     Past Surgical History:   Procedure Laterality Date    ADENOIDECTOMY  12/08/2017    Adenoidectomy    MYRINGOTOMY W/ TUBES  12/17/2014    Myringotomy - With Ventilating Tube Insertion    TONSILLECTOMY  12/08/2017    Tonsillectomy     No family history on file.  Social History     Tobacco Use    Smoking status: Never    Smokeless tobacco: Never   Substance Use Topics    Alcohol use: Never    Drug use: Never       Physical Exam   ED Triage Vitals [09/07/24 2226]   Temperature Heart Rate Respirations BP   36.3 °C (97.3 °F) 95 18 114/59      Pulse Ox Temp Source Heart Rate  Source Patient Position   95 % Temporal Monitor --      BP Location FiO2 (%)     -- --       Physical Exam  Vitals and nursing note reviewed.   Constitutional:       Appearance: She is well-developed.   HENT:      Head: Normocephalic.   Eyes:      Extraocular Movements: Extraocular movements intact.   Cardiovascular:      Rate and Rhythm: Normal rate and regular rhythm.   Pulmonary:      Effort: Pulmonary effort is normal.      Breath sounds: Normal breath sounds.   Abdominal:      General: Abdomen is flat. There is no distension.      Palpations: Abdomen is soft.      Tenderness: There is abdominal tenderness in the periumbilical area. There is no right CVA tenderness, left CVA tenderness or guarding. Negative signs include Mcclure's sign and Rovsing's sign.      Hernia: A hernia is present. Hernia is present in the umbilical area.   Skin:     General: Skin is warm.      Capillary Refill: Capillary refill takes less than 2 seconds.   Neurological:      General: No focal deficit present.   Psychiatric:         Mood and Affect: Mood normal.           ED Course & MDM   Diagnoses as of 09/10/24 1114   Bacterial vaginosis   Vaginal yeast infection                 No data recorded     [place] Coma Scale Score: 15 (09/07/24 2227 : Aranza Hylton, RN)                           Medical Decision Making  24-year-old female with intermittent periumbilical abdominal pain in the setting alcohol use, differential includes pancreatitis, liver disease, gastritis.  I have low suspicion for an intra-abdominal infection based off her physical exam, low suspicion for obstruction.  Will send labs, reevaluate.    I signed her out to night team with labs and re-eval pending.         Procedure  Procedures     Tex Almeida MD  09/10/24 1114

## 2024-09-08 NOTE — DISCHARGE INSTRUCTIONS
You were seen today for abdominal pain and vaginal discharge, you have bacterial vaginosis.  You should take the medication that we are going to prescribe you for 7 days, you should take all of these doses.  You should not take these while drinking, as they may make you feel quite ill.  You should not drink for 24 hours after your final dose of the medication

## 2024-09-08 NOTE — ED TRIAGE NOTES
Patient presents to the ED for abdominal pain x1 month. States it constantly hurts. Denies pregnancy, denies N/V/D, constipation, back pain, SOB, CP

## 2024-09-09 LAB
BACTERIA UR CULT: NORMAL
T VAGINALIS RRNA SPEC QL NAA+PROBE: NEGATIVE

## 2024-09-09 PROCEDURE — RXMED WILLOW AMBULATORY MEDICATION CHARGE

## 2024-09-09 NOTE — PROGRESS NOTES
Patient was signed out to me by previous provider, for full history physical please see their note.  Breathing, is a 24-year-old female presented for periumbilical abdominal pain in setting of alcohol use, was signed out to me pending urinalysis and swabs.  Patient swabs demonstrated clue cells, suggestive of bacterial vaginosis.  We will treat the patient with metronidazole.  Patient endorsed that she frequently gets yeast infections after she gets treated for BV, so also provided with prescription for fluconazole.

## 2024-09-13 ENCOUNTER — PHARMACY VISIT (OUTPATIENT)
Dept: PHARMACY | Facility: CLINIC | Age: 24
End: 2024-09-13
Payer: MEDICAID

## 2024-09-13 ENCOUNTER — OFFICE VISIT (OUTPATIENT)
Dept: OBSTETRICS AND GYNECOLOGY | Facility: CLINIC | Age: 24
End: 2024-09-13
Payer: COMMERCIAL

## 2024-09-13 VITALS
BODY MASS INDEX: 23.9 KG/M2 | SYSTOLIC BLOOD PRESSURE: 110 MMHG | DIASTOLIC BLOOD PRESSURE: 73 MMHG | WEIGHT: 143.6 LBS | HEART RATE: 76 BPM

## 2024-09-13 DIAGNOSIS — Z01.419 WELL WOMAN EXAM WITH ROUTINE GYNECOLOGICAL EXAM: Primary | ICD-10-CM

## 2024-09-13 DIAGNOSIS — Z97.5 IUD (INTRAUTERINE DEVICE) IN PLACE: ICD-10-CM

## 2024-09-13 PROBLEM — F81.9 LEARNING DIFFICULTY: Status: ACTIVE | Noted: 2024-09-13

## 2024-09-13 PROBLEM — H90.0 CONDUCTIVE HEARING LOSS, BILATERAL: Status: ACTIVE | Noted: 2024-09-13

## 2024-09-13 PROBLEM — L30.9 ECZEMA: Status: ACTIVE | Noted: 2024-09-13

## 2024-09-13 PROCEDURE — 99395 PREV VISIT EST AGE 18-39: CPT | Performed by: ADVANCED PRACTICE MIDWIFE

## 2024-09-13 NOTE — PROGRESS NOTES
Dipti Garcia is a 24 y.o. female who is here for IUD check (Pt declines STI screening /LMP 24/LAST pap 3/2023/Pt declines chaperon.).     Concerns today:  Lower abdominal pain     Periods are regular every 28-30 days, lasting 7 days.   Dysmenorrhea:none.   Cyclic symptoms include none.      Sexual Activity: sexually active, male partners; Patient reports 1 partners in the last 12 months.  Pain with intercourse? No       History of prior STI: none  Desires STI screening? No done  all negative    Current contraception: IUD placed 2023    Last pap:  NILM  History of abnormal Pap smear: no  Family history of uterine or ovarian cancer: no    Last mammogram: n/a  History of abnormal mammogram: no  Family history of breast cancer: no  OB History    Para Term  AB Living   2 2 2 0 0 2   SAB IAB Ectopic Multiple Live Births   0 0 0 0 2      Objective   /73   Pulse 76   Wt 65.1 kg (143 lb 9.6 oz)   LMP 2024 (Approximate)    Physical Exam  Constitutional:       Appearance: Normal appearance. She is normal weight.   Pulmonary:      Effort: Pulmonary effort is normal.   Abdominal:      General: Abdomen is flat. Bowel sounds are normal.      Palpations: Abdomen is soft.      Tenderness: There is abdominal tenderness.      Comments: +tenderness over deep palpation across bilateral lower abdomen   Genitourinary:     General: Normal vulva.      Labia:         Right: No rash, tenderness, lesion or injury.         Left: No rash, tenderness, lesion or injury.       Vagina: Vaginal discharge present.      Cervix: Discharge present. No friability, lesion or cervical bleeding.      Uterus: Not enlarged and not tender.       Adnexa:         Right: No mass, tenderness or fullness.          Left: No mass, tenderness or fullness.        Comments: IUD strings visible  Skin:     General: Skin is warm and dry.   Psychiatric:         Mood and Affect: Mood normal.         Behavior: Behavior  normal.         Thought Content: Thought content normal.         Judgment: Judgment normal.        Assessment/Plan   Problem List Items Addressed This Visit             ICD-10-CM       Ob-Gyn Problems    IUD (intrauterine device) in place Z97.5     Other Visit Diagnoses         Codes    Well woman exam with routine gynecological exam    -  Primary Z01.419          No follow-ups on file.  Loretta Nunez, KESHA-DELICIAM

## 2025-04-25 ENCOUNTER — HOSPITAL ENCOUNTER (EMERGENCY)
Facility: HOSPITAL | Age: 25
Discharge: HOME | End: 2025-04-25
Payer: COMMERCIAL

## 2025-04-25 VITALS
BODY MASS INDEX: 23.91 KG/M2 | OXYGEN SATURATION: 98 % | HEART RATE: 75 BPM | WEIGHT: 143.52 LBS | RESPIRATION RATE: 17 BRPM | DIASTOLIC BLOOD PRESSURE: 61 MMHG | TEMPERATURE: 97 F | SYSTOLIC BLOOD PRESSURE: 109 MMHG | HEIGHT: 65 IN

## 2025-04-25 DIAGNOSIS — B96.89 BACTERIAL VAGINOSIS: ICD-10-CM

## 2025-04-25 DIAGNOSIS — Z71.1 CONCERN ABOUT STD IN FEMALE WITHOUT DIAGNOSIS: Primary | ICD-10-CM

## 2025-04-25 DIAGNOSIS — N76.0 BACTERIAL VAGINOSIS: ICD-10-CM

## 2025-04-25 DIAGNOSIS — R10.13 EPIGASTRIC PAIN: ICD-10-CM

## 2025-04-25 LAB
APPEARANCE UR: CLEAR
BILIRUB UR STRIP.AUTO-MCNC: NEGATIVE MG/DL
CLUE CELLS SPEC QL WET PREP: PRESENT
COLOR UR: YELLOW
GLUCOSE UR STRIP.AUTO-MCNC: NORMAL MG/DL
HCV AB SER QL: NONREACTIVE
HIV 1+2 AB+HIV1 P24 AG SERPL QL IA: NONREACTIVE
KETONES UR STRIP.AUTO-MCNC: NEGATIVE MG/DL
LEUKOCYTE ESTERASE UR QL STRIP.AUTO: ABNORMAL
MUCOUS THREADS #/AREA URNS AUTO: NORMAL /LPF
NITRITE UR QL STRIP.AUTO: NEGATIVE
PH UR STRIP.AUTO: 5.5 [PH]
PREGNANCY TEST URINE, POC: NEGATIVE
PROT UR STRIP.AUTO-MCNC: NEGATIVE MG/DL
RBC # UR STRIP.AUTO: NEGATIVE MG/DL
RBC #/AREA URNS AUTO: NORMAL /HPF
SP GR UR STRIP.AUTO: 1.03
SQUAMOUS #/AREA URNS AUTO: NORMAL /HPF
T VAGINALIS SPEC QL WET PREP: ABNORMAL
TREPONEMA PALLIDUM IGG+IGM AB [PRESENCE] IN SERUM OR PLASMA BY IMMUNOASSAY: NONREACTIVE
UROBILINOGEN UR STRIP.AUTO-MCNC: NORMAL MG/DL
WBC #/AREA URNS AUTO: NORMAL /HPF
WBC VAG QL WET PREP: >50
YEAST VAG QL WET PREP: ABNORMAL

## 2025-04-25 PROCEDURE — 99284 EMERGENCY DEPT VISIT MOD MDM: CPT

## 2025-04-25 PROCEDURE — 87389 HIV-1 AG W/HIV-1&-2 AB AG IA: CPT

## 2025-04-25 PROCEDURE — 2500000001 HC RX 250 WO HCPCS SELF ADMINISTERED DRUGS (ALT 637 FOR MEDICARE OP): Mod: SE

## 2025-04-25 PROCEDURE — 86803 HEPATITIS C AB TEST: CPT

## 2025-04-25 PROCEDURE — 87086 URINE CULTURE/COLONY COUNT: CPT

## 2025-04-25 PROCEDURE — 81025 URINE PREGNANCY TEST: CPT

## 2025-04-25 PROCEDURE — 86780 TREPONEMA PALLIDUM: CPT

## 2025-04-25 PROCEDURE — 81001 URINALYSIS AUTO W/SCOPE: CPT

## 2025-04-25 PROCEDURE — 87491 CHLMYD TRACH DNA AMP PROBE: CPT

## 2025-04-25 PROCEDURE — 87210 SMEAR WET MOUNT SALINE/INK: CPT

## 2025-04-25 PROCEDURE — 36415 COLL VENOUS BLD VENIPUNCTURE: CPT

## 2025-04-25 RX ORDER — FAMOTIDINE 40 MG/1
40 TABLET, FILM COATED ORAL ONCE
Status: COMPLETED | OUTPATIENT
Start: 2025-04-25 | End: 2025-04-25

## 2025-04-25 RX ORDER — FAMOTIDINE 40 MG/1
40 TABLET, FILM COATED ORAL NIGHTLY PRN
Qty: 10 TABLET | Refills: 0 | Status: SHIPPED | OUTPATIENT
Start: 2025-04-25 | End: 2025-05-05

## 2025-04-25 RX ADMIN — FAMOTIDINE 40 MG: 40 TABLET, FILM COATED ORAL at 14:06

## 2025-04-25 ASSESSMENT — COLUMBIA-SUICIDE SEVERITY RATING SCALE - C-SSRS
2. HAVE YOU ACTUALLY HAD ANY THOUGHTS OF KILLING YOURSELF?: NO
1. IN THE PAST MONTH, HAVE YOU WISHED YOU WERE DEAD OR WISHED YOU COULD GO TO SLEEP AND NOT WAKE UP?: NO
6. HAVE YOU EVER DONE ANYTHING, STARTED TO DO ANYTHING, OR PREPARED TO DO ANYTHING TO END YOUR LIFE?: NO

## 2025-04-25 ASSESSMENT — PAIN SCALES - GENERAL: PAINLEVEL_OUTOF10: 7

## 2025-04-25 ASSESSMENT — PAIN DESCRIPTION - LOCATION: LOCATION: ABDOMEN

## 2025-04-25 ASSESSMENT — PAIN - FUNCTIONAL ASSESSMENT: PAIN_FUNCTIONAL_ASSESSMENT: 0-10

## 2025-04-25 ASSESSMENT — PAIN DESCRIPTION - PAIN TYPE: TYPE: ACUTE PAIN

## 2025-04-25 NOTE — ED PROVIDER NOTES
HPI   Chief Complaint   Patient presents with    Abdominal Cramping    Back Pain       25-year-old female presents for chief complaint of STD check.  She has vaginal discharge that is thick and abnormal for her.  Also endorses some epigastric abdominal discomfort and a frontal headache.  States that she is concerned that she may have contracted an STD but she is not sure because she is faithful with her boyfriend.  States that she does not fully trust him though.  Denies any lower abdominal pain.  States that the epigastric discomfort is likely from drinking alcohol frequently.  She drinks most days she says, but not daily.  Denies nausea, vomiting, or loose stools.  Denies changes in urination or bowel movements at all.  No other complaints.              Patient History   Medical History[1]  Surgical History[2]  Family History[3]  Social History[4]    Physical Exam   ED Triage Vitals [04/25/25 1208]   Temperature Heart Rate Respirations BP   36.1 °C (97 °F) 75 17 109/61      Pulse Ox Temp Source Heart Rate Source Patient Position   98 % Temporal -- --      BP Location FiO2 (%)     -- --       Physical Exam  Vitals and nursing note reviewed. Exam conducted with a chaperone present.   Constitutional:       General: She is not in acute distress.     Appearance: She is well-developed.   HENT:      Head: Normocephalic and atraumatic.   Eyes:      Conjunctiva/sclera: Conjunctivae normal.   Cardiovascular:      Rate and Rhythm: Normal rate and regular rhythm.      Heart sounds: No murmur heard.  Pulmonary:      Effort: Pulmonary effort is normal. No respiratory distress.      Breath sounds: Normal breath sounds.   Abdominal:      Palpations: Abdomen is soft.      Tenderness: There is no abdominal tenderness.   Genitourinary:     Exam position: Lithotomy position.      Comments: No discharge.  No bleeding.  Nontender.  Cervical os closed.  Musculoskeletal:         General: No swelling.      Cervical back: Neck supple.    Skin:     General: Skin is warm and dry.      Capillary Refill: Capillary refill takes less than 2 seconds.   Neurological:      Mental Status: She is alert.   Psychiatric:         Mood and Affect: Mood normal.           ED Course & MDM   Diagnoses as of 04/25/25 1430   Concern about STD in female without diagnosis   Epigastric pain                 No data recorded     Gile Coma Scale Score: 15 (04/25/25 1201 : Den Lucia RN)                           Medical Decision Making  Vital signs reviewed, unremarkable at this time.  Patient is well-appearing and in no apparent distress.  Speaks full sentences without difficulty.  Diagnostic testing performed.  Offered empiric treatment but patient declined.  States that she will follow-up with the results and come back if needed.  This is reasonable given the reassuring pelvic exam.  Advised to refrain from sex and also refrain from drinking and to come back to the ED if she needs help with quitting.  Not interested in [facility] at this time and does not view it is a problem.  Advised to follow-up with women's health and return to be treated if results are positive.  Advised to download the [employer] norm as well.  Patient in agreement with this plan.  States she feels comfortable leaving and will follow-up and return if necessary.  She understands all risks.  Discharged in stable condition.        Procedure  Procedures       [1]   Past Medical History:  Diagnosis Date    Bromhidrosis 09/25/2020    Body odor    Central perforation of tympanic membrane, unspecified ear 12/17/2014    Central perforation of tympanic membrane    Chlamydial infection, unspecified 04/06/2018    Chlamydia infection    Developmental disorder of scholastic skills, unspecified     Problems with learning    Encounter for immunization 04/06/2018    Immunization due    Latex allergy     Other specified health status     Varicella vaccination status unknown    Personal history of other diseases of  the nervous system and sense organs     History of hearing loss    Personal history of other mental and behavioral disorders     History of depression    Personal history of other mental and behavioral disorders 06/29/2018    History of anxiety   [2]   Past Surgical History:  Procedure Laterality Date    ADENOIDECTOMY  12/08/2017    Adenoidectomy    MYRINGOTOMY W/ TUBES  12/17/2014    Myringotomy - With Ventilating Tube Insertion    TONSILLECTOMY  12/08/2017    Tonsillectomy   [3] No family history on file.  [4]   Social History  Tobacco Use    Smoking status: Some Days     Types: Cigars    Smokeless tobacco: Never   Vaping Use    Vaping status: Never Used   Substance Use Topics    Alcohol use: Never    Drug use: Never        Rd Reinoso, KESHA-CNP  04/25/25 0147

## 2025-04-25 NOTE — ED TRIAGE NOTES
Pt presented to ED for c/o abd pain and HA for about 2 months. Denies chance of pregnancy. Denies N/V/D.

## 2025-04-26 LAB
BACTERIA UR CULT: NORMAL
C TRACH RRNA SPEC QL NAA+PROBE: NEGATIVE
HOLD SPECIMEN: NORMAL
N GONORRHOEA DNA SPEC QL PROBE+SIG AMP: NEGATIVE

## 2025-04-29 PROCEDURE — RXMED WILLOW AMBULATORY MEDICATION CHARGE

## 2025-04-29 RX ORDER — METRONIDAZOLE 500 MG/1
500 TABLET ORAL 2 TIMES DAILY
Qty: 14 TABLET | Refills: 0 | Status: SHIPPED | OUTPATIENT
Start: 2025-04-29 | End: 2025-05-09

## 2025-04-29 NOTE — PROGRESS NOTES
This patient called into the emergency department asking for the results of her STD testing from 4/25.  She identified herself by name and date of birth.  I looked into her chart, felt that her STD testing was positive for bacterial vaginosis and positive clue cells.  She did not receive a phone call from pharmacy.  States that her phone is turned off.  Number for the phone she is currently using was placed into her chart and is [phone].  She uses [facility] and Flagyl 500 mg twice daily was sent there.    Surendra Fuller, DO   Emergency Medicine PGY-3  [facility]

## 2025-05-01 ENCOUNTER — DOCUMENTATION (OUTPATIENT)
Dept: EMERGENCY MEDICINE | Facility: HOSPITAL | Age: 25
End: 2025-05-01
Payer: COMMERCIAL

## 2025-05-01 NOTE — RESEARCH NOTES
11:02 AM    Test:   Discussed HIV/HCV testing with the patient in the ED.   Patient received ED HIV/HCV testing: HIV and HCV test today.    Test Result:  HIV negative  HCV negative    Result notification:  Patient was notified of their test results on [05-] via In-person vs phone: Phone after verifying 3 patient identifiers    Follow-up plan:   If patient is positive for HIV and/or HCV, referral to outpatient treatment was attempted.     Referral successful: No, Describe None  If Yes:   Patient was referred to HIV/HCV outpatient referral: Other No Referral  Patient has appointment at [No Appointment] on [Date]    Signed  Catalino Serrano  HIV/HCV FOCUS Program  Department of Emergency Medicine - Research  Please contact me via email or Epic Chat with questions

## 2025-05-02 ENCOUNTER — PHARMACY VISIT (OUTPATIENT)
Dept: PHARMACY | Facility: CLINIC | Age: 25
End: 2025-05-02
Payer: MEDICAID

## 2025-08-27 ENCOUNTER — OFFICE VISIT (OUTPATIENT)
Dept: URGENT CARE | Age: 25
End: 2025-08-27
Payer: COMMERCIAL

## 2025-08-27 VITALS
HEART RATE: 83 BPM | TEMPERATURE: 98.3 F | DIASTOLIC BLOOD PRESSURE: 62 MMHG | RESPIRATION RATE: 16 BRPM | SYSTOLIC BLOOD PRESSURE: 113 MMHG | OXYGEN SATURATION: 99 %

## 2025-08-27 DIAGNOSIS — N89.8 VAGINAL DISCHARGE: Primary | ICD-10-CM

## 2025-08-27 DIAGNOSIS — F17.200 NICOTINE DEPENDENCE WITH CURRENT USE: ICD-10-CM

## 2025-08-27 DIAGNOSIS — N76.0 ACUTE VAGINITIS: ICD-10-CM

## 2025-08-27 LAB
CHLAMYDIA TRACHOMATIS: NOT DETECTED
ELECTRONIC CONTROL: NORMAL
INTERNAL PROCESS CONTROL: NORMAL
NEISSERIA GONORRHOEAE: NOT DETECTED
POC APPEARANCE, URINE: ABNORMAL
POC BILIRUBIN, URINE: NEGATIVE
POC BLOOD, URINE: NEGATIVE
POC COLOR, URINE: YELLOW
POC GLUCOSE, URINE: NEGATIVE MG/DL
POC KETONES, URINE: NEGATIVE MG/DL
POC LEUKOCYTES, URINE: ABNORMAL
POC NITRITE,URINE: NEGATIVE
POC PH, URINE: 6 PH
POC PROTEIN, URINE: NEGATIVE MG/DL
POC SPECIFIC GRAVITY, URINE: 1.02
POC TRICHOMONAS: NEGATIVE
POC UROBILINOGEN, URINE: 0.2 EU/DL
PREGNANCY TEST URINE, POC: NEGATIVE

## 2025-08-27 PROCEDURE — 81025 URINE PREGNANCY TEST: CPT | Performed by: PHYSICIAN ASSISTANT

## 2025-08-27 PROCEDURE — 99203 OFFICE O/P NEW LOW 30 MIN: CPT | Performed by: PHYSICIAN ASSISTANT

## 2025-08-27 PROCEDURE — 99406 BEHAV CHNG SMOKING 3-10 MIN: CPT | Performed by: PHYSICIAN ASSISTANT

## 2025-08-27 PROCEDURE — 87808 TRICHOMONAS ASSAY W/OPTIC: CPT | Performed by: PHYSICIAN ASSISTANT

## 2025-08-27 PROCEDURE — 81003 URINALYSIS AUTO W/O SCOPE: CPT | Performed by: PHYSICIAN ASSISTANT

## 2025-08-27 PROCEDURE — 87801 DETECT AGNT MULT DNA AMPLI: CPT | Performed by: PHYSICIAN ASSISTANT

## 2025-08-27 ASSESSMENT — ENCOUNTER SYMPTOMS
FLANK PAIN: 0
DYSURIA: 0
ABDOMINAL PAIN: 0
CHILLS: 0
FREQUENCY: 0
FEVER: 0
BACK PAIN: 0
HEMATURIA: 0

## 2025-08-27 ASSESSMENT — PATIENT HEALTH QUESTIONNAIRE - PHQ9
1. LITTLE INTEREST OR PLEASURE IN DOING THINGS: NOT AT ALL
2. FEELING DOWN, DEPRESSED OR HOPELESS: NOT AT ALL
SUM OF ALL RESPONSES TO PHQ9 QUESTIONS 1 AND 2: 0

## 2025-08-28 LAB — BV SCORE VAG QL: NORMAL

## 2025-09-01 DIAGNOSIS — B37.9 YEAST INFECTION: Primary | ICD-10-CM

## 2025-09-01 PROCEDURE — RXMED WILLOW AMBULATORY MEDICATION CHARGE

## 2025-09-01 RX ORDER — CARBOXYMETHYLCELLULOSE SODIUM 0.5 G/100ML
1 SOLUTION/ DROPS OPHTHALMIC NIGHTLY
Qty: 24 G | Refills: 0 | Status: SHIPPED | OUTPATIENT
Start: 2025-09-01 | End: 2025-09-05

## 2025-09-02 ENCOUNTER — PHARMACY VISIT (OUTPATIENT)
Dept: PHARMACY | Facility: CLINIC | Age: 25
End: 2025-09-02
Payer: MEDICAID